# Patient Record
Sex: MALE | Race: WHITE
[De-identification: names, ages, dates, MRNs, and addresses within clinical notes are randomized per-mention and may not be internally consistent; named-entity substitution may affect disease eponyms.]

---

## 2020-01-21 ENCOUNTER — HOSPITAL ENCOUNTER (OUTPATIENT)
Dept: HOSPITAL 92 - SCSULT | Age: 80
Discharge: HOME | End: 2020-01-21
Attending: INTERNAL MEDICINE
Payer: MEDICARE

## 2020-01-21 DIAGNOSIS — R60.9: ICD-10-CM

## 2020-01-21 DIAGNOSIS — I12.9: Primary | ICD-10-CM

## 2020-01-21 DIAGNOSIS — N18.3: ICD-10-CM

## 2020-01-21 LAB
ANION GAP SERPL CALC-SCNC: 10 MMOL/L (ref 10–20)
BUN SERPL-MCNC: 26 MG/DL (ref 8.4–25.7)
CALCIUM SERPL-MCNC: 9.7 MG/DL (ref 7.8–10.44)
CHLORIDE SERPL-SCNC: 105 MMOL/L (ref 98–107)
CO2 SERPL-SCNC: 30 MMOL/L (ref 23–31)
CREAT CL PREDICTED SERPL C-G-VRATE: 0 ML/MIN (ref 70–130)
FOLLOW-UP CHEMISTRY COMP?: YES
FOLLOW-UP RESULT - CHEMISTRY: (no result)
GLUCOSE SERPL-MCNC: 118 MG/DL (ref 83–110)
HGB BLD-MCNC: 14.7 G/DL (ref 14–18)
MCH RBC QN AUTO: 30.6 PG (ref 27–31)
MCV RBC AUTO: 93.2 FL (ref 78–98)
PLATELET # BLD AUTO: 281 THOU/UL (ref 130–400)
POTASSIUM SERPL-SCNC: 3.7 MMOL/L (ref 3.5–5.1)
PROT UR-MCNC: (no result) MG/DL (ref 1–14)
RBC # BLD AUTO: 4.82 MILL/UL (ref 4.7–6.1)
SODIUM SERPL-SCNC: 141 MMOL/L (ref 136–145)
WBC # BLD AUTO: 6.3 THOU/UL (ref 4.8–10.8)

## 2020-01-21 PROCEDURE — 83970 ASSAY OF PARATHORMONE: CPT

## 2020-01-21 PROCEDURE — 85027 COMPLETE CBC AUTOMATED: CPT

## 2020-01-21 PROCEDURE — 86225 DNA ANTIBODY NATIVE: CPT

## 2020-01-21 PROCEDURE — 82570 ASSAY OF URINE CREATININE: CPT

## 2020-01-21 PROCEDURE — 80048 BASIC METABOLIC PNL TOTAL CA: CPT

## 2020-01-21 PROCEDURE — 86038 ANTINUCLEAR ANTIBODIES: CPT

## 2020-01-21 PROCEDURE — 82306 VITAMIN D 25 HYDROXY: CPT

## 2020-01-21 PROCEDURE — 76770 US EXAM ABDO BACK WALL COMP: CPT

## 2020-01-21 PROCEDURE — 84156 ASSAY OF PROTEIN URINE: CPT

## 2020-01-21 PROCEDURE — 81003 URINALYSIS AUTO W/O SCOPE: CPT

## 2020-01-21 PROCEDURE — 84100 ASSAY OF PHOSPHORUS: CPT

## 2020-01-21 NOTE — ULT
RENAL ULTRASOUND



HISTORY: Chronic kidney disease



COMPARISON: None



FINDINGS: 



Right Kidney:

Size:  10.5 x 4.3 x 5.1

Abnormality: Right renal cortical thickness is 1.4 cm. No focal renal lesion or hydronephrosis is dem
onstrated.



Left Kidney:

Size:  10.5 x 4.6 x 4.1 cm.

Abnormality: The left renal cortical thickness is 1.5 cm. No focal renal lesion or hydronephrosis is 
demonstrated.



Urinary bladder: Decompressed





IMPRESSION: No focal renal lesion or hydronephrosis.



Reported By: Arun Sims 

Electronically Signed:  1/21/2020 10:31 AM

## 2020-01-22 LAB
ANA SYMPHONY (QUANTITATIVE): 0.3 RATIO
DSDNA IGG SERPL IA-ACNC: 1.4 IU/ML
DSDNA INTERPRETATION: (no result)
Lab: (no result)
Lab: YES